# Patient Record
Sex: FEMALE | Race: WHITE | NOT HISPANIC OR LATINO | Employment: OTHER | URBAN - METROPOLITAN AREA
[De-identification: names, ages, dates, MRNs, and addresses within clinical notes are randomized per-mention and may not be internally consistent; named-entity substitution may affect disease eponyms.]

---

## 2017-03-30 RX ORDER — CALCIPOTRIENE 50 UG/G
OINTMENT TOPICAL
Qty: 120 G | Refills: 0 | OUTPATIENT
Start: 2017-03-30

## 2017-04-06 RX ORDER — CALCIPOTRIENE 50 UG/G
OINTMENT TOPICAL
Qty: 120 G | Refills: 0 | OUTPATIENT
Start: 2017-04-06

## 2022-08-08 ENCOUNTER — APPOINTMENT (OUTPATIENT)
Dept: GENERAL RADIOLOGY | Facility: CLINIC | Age: 72
End: 2022-08-08
Attending: EMERGENCY MEDICINE
Payer: COMMERCIAL

## 2022-08-08 ENCOUNTER — HOSPITAL ENCOUNTER (EMERGENCY)
Facility: CLINIC | Age: 72
Discharge: HOME | End: 2022-08-09
Attending: EMERGENCY MEDICINE | Admitting: EMERGENCY MEDICINE
Payer: COMMERCIAL

## 2022-08-08 ENCOUNTER — APPOINTMENT (OUTPATIENT)
Dept: CT IMAGING | Facility: CLINIC | Age: 72
End: 2022-08-08
Attending: EMERGENCY MEDICINE
Payer: COMMERCIAL
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
## 2022-08-08 DIAGNOSIS — S01.81XA CHIN LACERATION, INITIAL ENCOUNTER: ICD-10-CM

## 2022-08-08 DIAGNOSIS — E83.52 HYPERCALCEMIA: ICD-10-CM

## 2022-08-08 DIAGNOSIS — I10 BENIGN ESSENTIAL HYPERTENSION: ICD-10-CM

## 2022-08-08 LAB
ALBUMIN SERPL-MCNC: 4.2 G/DL (ref 3.4–5)
ALP SERPL-CCNC: 119 U/L (ref 40–150)
ALT SERPL W P-5'-P-CCNC: 27 U/L (ref 0–50)
ANION GAP SERPL CALCULATED.3IONS-SCNC: 3 MMOL/L (ref 3–14)
AST SERPL W P-5'-P-CCNC: 26 U/L (ref 0–45)
ATRIAL RATE - MUSE: 68 BPM
BASOPHILS # BLD AUTO: 0.1 10E3/UL (ref 0–0.2)
BASOPHILS NFR BLD AUTO: 0 %
BILIRUB SERPL-MCNC: 0.5 MG/DL (ref 0.2–1.3)
BUN SERPL-MCNC: 24 MG/DL (ref 7–30)
CALCIUM SERPL-MCNC: 11.1 MG/DL (ref 8.5–10.1)
CHLORIDE BLD-SCNC: 105 MMOL/L (ref 94–109)
CO2 SERPL-SCNC: 28 MMOL/L (ref 20–32)
CREAT SERPL-MCNC: 1.01 MG/DL (ref 0.52–1.04)
DIASTOLIC BLOOD PRESSURE - MUSE: NORMAL MMHG
EOSINOPHIL # BLD AUTO: 0 10E3/UL (ref 0–0.7)
EOSINOPHIL NFR BLD AUTO: 0 %
ERYTHROCYTE [DISTWIDTH] IN BLOOD BY AUTOMATED COUNT: 12.8 % (ref 10–15)
GFR SERPL CREATININE-BSD FRML MDRD: 59 ML/MIN/1.73M2
GLUCOSE BLD-MCNC: 110 MG/DL (ref 70–99)
HCT VFR BLD AUTO: 46.4 % (ref 35–47)
HGB BLD-MCNC: 15.4 G/DL (ref 11.7–15.7)
IMM GRANULOCYTES # BLD: 0 10E3/UL
IMM GRANULOCYTES NFR BLD: 0 %
INTERPRETATION ECG - MUSE: NORMAL
LYMPHOCYTES # BLD AUTO: 1.3 10E3/UL (ref 0.8–5.3)
LYMPHOCYTES NFR BLD AUTO: 10 %
MCH RBC QN AUTO: 30.7 PG (ref 26.5–33)
MCHC RBC AUTO-ENTMCNC: 33.2 G/DL (ref 31.5–36.5)
MCV RBC AUTO: 93 FL (ref 78–100)
MONOCYTES # BLD AUTO: 0.6 10E3/UL (ref 0–1.3)
MONOCYTES NFR BLD AUTO: 4 %
NEUTROPHILS # BLD AUTO: 11.2 10E3/UL (ref 1.6–8.3)
NEUTROPHILS NFR BLD AUTO: 86 %
NRBC # BLD AUTO: 0 10E3/UL
NRBC BLD AUTO-RTO: 0 /100
P AXIS - MUSE: 85 DEGREES
PLATELET # BLD AUTO: 312 10E3/UL (ref 150–450)
POTASSIUM BLD-SCNC: 4.8 MMOL/L (ref 3.4–5.3)
PR INTERVAL - MUSE: 140 MS
PROT SERPL-MCNC: 7.6 G/DL (ref 6.8–8.8)
QRS DURATION - MUSE: 84 MS
QT - MUSE: 420 MS
QTC - MUSE: 446 MS
R AXIS - MUSE: 15 DEGREES
RBC # BLD AUTO: 5.01 10E6/UL (ref 3.8–5.2)
SODIUM SERPL-SCNC: 136 MMOL/L (ref 133–144)
SYSTOLIC BLOOD PRESSURE - MUSE: NORMAL MMHG
T AXIS - MUSE: 81 DEGREES
TROPONIN I SERPL HS-MCNC: 18 NG/L
TROPONIN I SERPL HS-MCNC: 21 NG/L
VENTRICULAR RATE- MUSE: 68 BPM
WBC # BLD AUTO: 13.2 10E3/UL (ref 4–11)

## 2022-08-08 PROCEDURE — 73130 X-RAY EXAM OF HAND: CPT | Mod: LT

## 2022-08-08 PROCEDURE — 36415 COLL VENOUS BLD VENIPUNCTURE: CPT | Performed by: EMERGENCY MEDICINE

## 2022-08-08 PROCEDURE — 80053 COMPREHEN METABOLIC PANEL: CPT | Performed by: NURSE PRACTITIONER

## 2022-08-08 PROCEDURE — 93005 ELECTROCARDIOGRAM TRACING: CPT

## 2022-08-08 PROCEDURE — 84484 ASSAY OF TROPONIN QUANT: CPT | Mod: 91 | Performed by: EMERGENCY MEDICINE

## 2022-08-08 PROCEDURE — 36415 COLL VENOUS BLD VENIPUNCTURE: CPT | Performed by: NURSE PRACTITIONER

## 2022-08-08 PROCEDURE — 80053 COMPREHEN METABOLIC PANEL: CPT | Performed by: EMERGENCY MEDICINE

## 2022-08-08 PROCEDURE — 99285 EMERGENCY DEPT VISIT HI MDM: CPT | Mod: 25

## 2022-08-08 PROCEDURE — 85004 AUTOMATED DIFF WBC COUNT: CPT | Performed by: NURSE PRACTITIONER

## 2022-08-08 PROCEDURE — 12001 RPR S/N/AX/GEN/TRNK 2.5CM/<: CPT

## 2022-08-08 PROCEDURE — 70450 CT HEAD/BRAIN W/O DYE: CPT

## 2022-08-08 PROCEDURE — 84484 ASSAY OF TROPONIN QUANT: CPT | Performed by: EMERGENCY MEDICINE

## 2022-08-08 PROCEDURE — 85025 COMPLETE CBC W/AUTO DIFF WBC: CPT | Performed by: EMERGENCY MEDICINE

## 2022-08-08 PROCEDURE — 84484 ASSAY OF TROPONIN QUANT: CPT | Performed by: NURSE PRACTITIONER

## 2022-08-08 ASSESSMENT — ENCOUNTER SYMPTOMS
CONFUSION: 0
NECK PAIN: 0
WOUND: 1
LIGHT-HEADEDNESS: 0
VOMITING: 0
ARTHRALGIAS: 1

## 2022-08-08 NOTE — ED TRIAGE NOTES
"Laceration to chin, bleeding controlled in triage. Pt reports \"I must have hit chin on sidewalk outside.\" Pt reports, \"I just did not even know where I was then I was inside.\" Pt reports unsure if she passed out before event. Hx of similar episode 02/26. Hospitalized a [facility] for \"syncope\"   "

## 2022-08-09 VITALS
DIASTOLIC BLOOD PRESSURE: 86 MMHG | BODY MASS INDEX: 20.09 KG/M2 | HEART RATE: 79 BPM | OXYGEN SATURATION: 99 % | SYSTOLIC BLOOD PRESSURE: 183 MMHG | TEMPERATURE: 96.6 F | HEIGHT: 66 IN | RESPIRATION RATE: 18 BRPM | WEIGHT: 125 LBS

## 2022-08-09 NOTE — ED PROVIDER NOTES
History   Chief Complaint:  Facial Laceration and Syncope       HPI   Anabel Stein is a 71 year old female with history of CKD, HTN, ASCVD, and hyperlipidemia who presents with a laceration to the chin after an unwitnessed fall earlier today. Patient says she was outside near the deck and pool area when she suddenly found herself on the ground and was bleeding from her chin. She is unsure if she tripped and does not know if she lost consciousness before the fall.  thinks she tripped. Patient was able to get up herself after the incident. She denies experiencing lightheadedness or chest pain prior to the fall. Patient also notes a laceration to the left hand and pain and swelling to the top of the left hand. No vomiting, neck, or knee pain. Her  denies any confusion after the fall. Her most recent Tdap was 2013.     Review of Systems   Cardiovascular: Negative for chest pain.   Gastrointestinal: Negative for vomiting.   Musculoskeletal: Positive for arthralgias (left hand). Negative for neck pain.        Left hand swelling   Skin: Positive for wound.   Neurological: Negative for light-headedness.   Psychiatric/Behavioral: Negative for confusion.   All other systems reviewed and are negative.      Allergies:  Sulfa Drugs  Chromium  Clindamycin    Medications:  Lipitor    Past Medical History:     CKD, stage 3  Coronary artery calcification  Postmenopausal HRT  Anxiety  HTN  Osteopenia  Hyperlipidemia  Tubular adenoma  Rosacea  ASCVD   Psoriasis    Past Surgical History:    Implant in left ear  Carpal tunnel release, bilateral  Hemorrhoidectomy  Surgery for bloody discharge from nipple  Colonoscopy     Family History:    Daughter: Alcoholism     Social History:  The patient presents to the ED with her   PCP: Shanta Garcia, Family Medicine     Physical Exam     Patient Vitals for the past 24 hrs:   BP Temp Temp src Pulse Resp SpO2 Height Weight   08/08/22 2245 (!) 193/97 -- -- 77 --  "100 % 1.676 m (5' 6\") 56.7 kg (125 lb)   08/08/22 1820 (!) 155/76 (!) 96.6  F (35.9  C) Temporal 71 25 99 % -- --       Physical Exam  General: Sitting up in bed  Eyes:  The pupils are equal and round    Conjunctivae and sclerae are normal  ENT:    Laceration on chin.  Nontender face.  No malocclusion.  No dental trauma  Neck:  Normal range of motion, no midline cervical spine tenderness   CV:  Regular rate, regular rhythm    Skin warm and well perfused   Resp:  Non labored breathing on room air    No tachypnea    No cough heard    Lungs clear bilaterally  GI:  Abdomen is soft, there is no rigidity    No distension    No rebound tenderness     No abdominal tenderness  MS:  Mild tenderness and swelling to the dorsum of the left hand  Skin:  Laceration on chin, abrasion on palm of left hand  Neuro:   Awake, alert.      Speech is normal and fluent.    Face is symmetric.     GCS 15    Moves all extremities equally  Psych: Normal affect.  Appropriate interactions.    Emergency Department Course   ECG  ECG results from 08/08/22   EKG 12 lead     Value    Systolic Blood Pressure     Diastolic Blood Pressure     Ventricular Rate 68    Atrial Rate 68    IN Interval 140    QRS Duration 84        QTc 446    P Axis 85    R AXIS 15    T Axis 81    Interpretation ECG      Sinus rhythm  Normal ECG  No previous ECGs available  Confirmed by GENERATED REPORT, COMPUTER ([id number]),  Franchesca Montanez ([id number]) on 8/8/2022 11:22:03 PM         Imaging:  XR Hand Left G/E 3 Views   Final Result   IMPRESSION:       No evidence of acute fracture or dislocation. Joint spaces are preserved. Soft tissues grossly unremarkable.      Head CT w/o contrast   Final Result   IMPRESSION:   1.  No CT evidence for acute intracranial process.   2.  Brain atrophy and presumed chronic microvascular ischemic changes as above.        Report per radiology    Laboratory:  Labs Ordered and Resulted from Time of ED Arrival to Time of ED Departure "   COMPREHENSIVE METABOLIC PANEL - Abnormal       Result Value    Sodium 136      Potassium 4.8      Chloride 105      Carbon Dioxide (CO2) 28      Anion Gap 3      Urea Nitrogen 24      Creatinine 1.01      Calcium 11.1 (*)     Glucose 110 (*)     Alkaline Phosphatase 119      AST 26      ALT 27      Protein Total 7.6      Albumin 4.2      Bilirubin Total 0.5      GFR Estimate 59 (*)    CBC WITH PLATELETS AND DIFFERENTIAL - Abnormal    WBC Count 13.2 (*)     RBC Count 5.01      Hemoglobin 15.4      Hematocrit 46.4      MCV 93      MCH 30.7      MCHC 33.2      RDW 12.8      Platelet Count 312      % Neutrophils 86      % Lymphocytes 10      % Monocytes 4      % Eosinophils 0      % Basophils 0      % Immature Granulocytes 0      NRBCs per 100 WBC 0      Absolute Neutrophils 11.2 (*)     Absolute Lymphocytes 1.3      Absolute Monocytes 0.6      Absolute Eosinophils 0.0      Absolute Basophils 0.1      Absolute Immature Granulocytes 0.0      Absolute NRBCs 0.0     TROPONIN I - Normal    Troponin I High Sensitivity 18     TROPONIN I - Normal    Troponin I High Sensitivity 21        Procedures    Laceration Repair      Procedure: Laceration Repair    Indication: Laceration    Consent: Verbal    Location: Chin    Length: 1 cm    Preparation: Irrigation with Sterile Saline.    Anesthesia/Sedation: Lidocaine with Epinephrine - 1%      Treatment/Exploration: Wound explored, no foreign bodies found     Closure: The wound was closed with one layer. Skin/superficial layer was closed with 2 x 5-0 Fast gut absorbable  using Interrupted sutures.     Patient Status: The patient tolerated the procedure well: Yes. There were no complications.    Emergency Department Course:         Reviewed:  I reviewed nursing notes, vitals, past medical history, Care Everywhere and [facility]    Assessments:  2307 I obtained history and examined the patient as noted above.     0035 I rechecked the patient and explained findings.     Disposition:  The  patient was discharged to home.     Impression & Plan     Medical Decision Making:  Anabel Stein is a 71 year old female who presents for evaluation of a fall. The fall appears mechanical in nature.  Possible that this could have been syncope though her  thinks that she tripped. no prodromal symptoms.  I doubt stroke, cardiac arrhythmia, other serious pathology.  Differential diagnosis included intracranial bleed, skull fracture, concussion, soft tissue injury, contusion, amongst others.  Upon exam, the patient is neurologically intact. Due to the patient's age, head CT was completed due to [facility] head CT rules.  Imaging results returned without significant pathology.  The patient ambulated with staff here in the ED and completed this well.  Laceration was repaired on the chin.  No evidence of facial bone fracture.  Calcium was mildly elevated and recommend repeat through primary care provider.  Seizure seems unlikely.  X-ray of left hand with no fracture.  Left hand abrasion does not require repair.  It does seem that the patient may have some memory issues at baseline based on my conversations with her in the emergency department.  notes no new confusion after the fall so seems that this is baseline.    Diagnosis:    ICD-10-CM    1. Chin laceration, initial encounter  S01.81XA    2. Fall, initial encounter  W19.XXXA      Scribe Disclosure:  I, Tricia Roberto, am serving as a scribe at 11:07 PM on 8/8/2022 to document services personally performed by Shari Chand MD based on my observations and the provider's statements to me.          Shari Chand MD  08/09/22 0968

## 2022-08-09 NOTE — DISCHARGE INSTRUCTIONS
Sutures should dissolve on their own. Daily place new bandaid on chin, can get area wet after 24 hours  Watch for signs of infection on chin which would include spreading redness, pus, fever  Ice the hand as needed  Follow up with your primary care provider for repeat calcium and repeat blood pressure    Discharge Instructions  Laceration (Cut)    You were seen today for a laceration (cut).  Your provider examined your laceration for any problems such a buried foreign body (like glass, a splinter, or gravel), or injury to blood vessels, tendons, and nerves.  Your provider may have also rinsed and/or scrubbed your laceration to help prevent an infection. It may not be possible to find all problems with your laceration on the first visit; occasionally foreign bodies or a tendon injury can go undetected.    Your laceration may have been closed in one of several ways:  No closure: many wounds will heal just fine without closure.  Stitches: regular stitches that require removal.  Staples: skin staples are often used in the scalp/head.  Wound adhesive (glue): skin glue can be used for certain lacerations and doesn t require removal.  Wound strips (aka Butterfly bandages or steri-strips): these are bandages that help to close a wound.  Absorbable stitches:  dissolving  stitches that go away on their own and usually don t require removal.    A small percentage of wounds will develop an infection regardless of how well the wound is cared for. Antibiotics are generally not indicated to prevent an infection so are only given for a small number of high-risk wounds. Some lacerations are too high risk to close, and are left open to heal because closure can increase the likelihood that an infection will develop.    Remember that all lacerations, no matter how expertly repaired, will cause scarring. We consider many factors, techniques, and materials, in our efforts to provide the best possible cosmetic outcome.    Generally, every  Emergency Department visit should have a follow-up clinic visit with either a primary or a specialty clinic/provider. Please follow-up as instructed by your emergency provider today.     Return to the Emergency Department right away if:  You have more redness, swelling, pain, drainage (pus), a bad smell, or red streaking from your laceration as these symptoms could indicate an infection.  You have a fever of 100.4 F or more.  You have bleeding that you cannot stop at home. If your cut starts to bleed, hold pressure on the bleeding area with a clean cloth or put pressure over the bandage.  If the bleeding does not stop after using constant pressure for 30 minutes, you should return to the Emergency Department for further treatment.  An area past the laceration is cool, pale, or blue compared with the other side, or has a slower return of color when squeezed.  Your dressing seems too tight or starts to get uncomfortable or painful. For children, signs of a problem might be irritability or restlessness.  You have loss of normal function or use of an area, such as being unable to straighten or bend a finger normally.  You have a numb area past the laceration.    Return to the Emergency Department or see your regular provider if:  The laceration starts to come open.   You have something coming out of the cut or a feeling that there is something in the laceration.  Your wound will not heal, or keeps breaking open. There can always be glass, wood, dirt or other things in any wound.  They will not always show up, even on x-rays.  If a wound does not heal, this may be why, and it is important to follow-up with your regular provider.    Home Care:  Take your dressing off in 12-24 hours, or as instructed by your provider, to check your laceration. Remove the dressing sooner if it seems too tight or painful, or if it is getting numb, tingly, or pale past the dressing.  Gently wash your laceration 1-2 times daily with clean  water and mild soap. It is okay to shower or run clean water over the laceration, but do not let the laceration soak in water (no swimming).  If your laceration was closed with wound adhesive or strips: pat it dry and leave it open to the air. For all other repairs: after you wash your laceration, or at least 2 times a day, apply antibiotic ointment (such as Neosporin  or Bacitracin ) to the laceration, then cover it with a Band-Aid  or gauze.  Keep the laceration clean. Wear gloves or other protective clothing if you are around dirt.    Follow-up for removal:  If your wound was closed with staples or regular stitches, they need to be removed according to the instructions and timeline specified by your provider today.  If your wound was closed with absorbable ( dissolving ) sutures, they should fall out, dissolve, or not be visible in about one week. If they are still visible, then they should be removed according to the instructions and timeline specified by your provider today.    Scars:  To help minimize scarring:  Wear sunscreen over the healed laceration when out in the sun.  Massage the area regularly once healed.  You may apply Vitamin E to the healed wound.  Wait. Scars improve in appearance over months and years.    If you were given a prescription for medicine here today, be sure to read all of the information (including the package insert) that comes with your prescription.  This will include important information about the medicine, its side effects, and any warnings that you need to know about.  The pharmacist who fills the prescription can provide more information and answer questions you may have about the medicine.  If you have questions or concerns that the pharmacist cannot address, please call or return to the Emergency Department.       Remember that you can always come back to the Emergency Department if you are not able to see your regular provider in the amount of time listed above, if you get  any new symptoms, or if there is anything that worries you.